# Patient Record
Sex: FEMALE | URBAN - METROPOLITAN AREA
[De-identification: names, ages, dates, MRNs, and addresses within clinical notes are randomized per-mention and may not be internally consistent; named-entity substitution may affect disease eponyms.]

---

## 2023-07-30 ENCOUNTER — NURSE TRIAGE (OUTPATIENT)
Dept: NURSING | Facility: CLINIC | Age: 78
End: 2023-07-30

## 2023-07-30 NOTE — TELEPHONE ENCOUNTER
Patient's daughter is calling on her behalf.  She is concerned that her mother has a facial droop.  The mother refuses to give consent to talk to nurse triage.  Recommended that she try to convince her to either give consent or go to the ER.  If she will not do either of those things she can always call 911 it is a free service to have her assessed.  Daughter was appreciative no need for triage.  Reason for Disposition   Nursing judgment, per information in Reference    Protocols used: No Guideline [id number]  Dana Miller RN on 7/30/2023 at 12:29 AM